# Patient Record
Sex: MALE | Race: WHITE | NOT HISPANIC OR LATINO
[De-identification: names, ages, dates, MRNs, and addresses within clinical notes are randomized per-mention and may not be internally consistent; named-entity substitution may affect disease eponyms.]

---
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
## 2018-01-02 ENCOUNTER — APPOINTMENT (OUTPATIENT)
Dept: OTOLARYNGOLOGY | Facility: CLINIC | Age: 18
End: 2018-01-02
Payer: COMMERCIAL

## 2018-01-02 VITALS
SYSTOLIC BLOOD PRESSURE: 116 MMHG | HEART RATE: 62 BPM | HEIGHT: 68 IN | WEIGHT: 162 LBS | BODY MASS INDEX: 24.55 KG/M2 | DIASTOLIC BLOOD PRESSURE: 73 MMHG

## 2018-01-02 DIAGNOSIS — C73 MALIGNANT NEOPLASM OF THYROID GLAND: ICD-10-CM

## 2018-01-02 PROCEDURE — 99024 POSTOP FOLLOW-UP VISIT: CPT

## 2018-01-29 ENCOUNTER — APPOINTMENT (OUTPATIENT)
Dept: NUCLEAR MEDICINE | Facility: HOSPITAL | Age: 18
End: 2018-01-29

## 2018-01-30 ENCOUNTER — APPOINTMENT (OUTPATIENT)
Dept: NUCLEAR MEDICINE | Facility: HOSPITAL | Age: 18
End: 2018-01-30

## 2018-01-31 ENCOUNTER — APPOINTMENT (OUTPATIENT)
Dept: NUCLEAR MEDICINE | Facility: HOSPITAL | Age: 18
End: 2018-01-31

## 2018-02-02 ENCOUNTER — APPOINTMENT (OUTPATIENT)
Dept: NUCLEAR MEDICINE | Facility: HOSPITAL | Age: 18
End: 2018-02-02

## 2018-11-05 ENCOUNTER — INBOUND DOCUMENT (OUTPATIENT)
Age: 18
End: 2018-11-05

## 2019-04-29 NOTE — ASU PREOP CHECKLIST - TYPE OF SOLUTION
Noted.  Please call and  Please convey to patient:    1.  I would not do hormone testing  2.  Is there a rash (related to \"my skin is very reactive\") any fever? Flu like symptoms?   3.  This can be related to her Bipolar disorder but it is hard to say as hormones play a part in our mood throughout our lives.  4.  Any other new things, diet, medications, OTC, herbal?   5.  Any SI?  6.  Diarrhea is not usually associated with Lamictal    Did patient noticed this before when taking Lamictal?  Is it possible patient could be pregnant?  At this point if all questions above are negative if this is due to Lamictal or something else.  Patient may discontinue Lamictal though it is at low dose and would look to have improvement in depression at higher dose.  Find out if it is intolerable and patient would like to stop at this point given the information provided.    Thank you.   LR

## 2019-08-05 ENCOUNTER — INBOUND DOCUMENT (OUTPATIENT)
Age: 19
End: 2019-08-05

## 2020-03-18 NOTE — H&P PST ADULT - ENDOCRINE COMMENTS
ER notified. They will be in contact with patient to arrange COVID-19 testing.   denies diabetes; thyroid cancer; enlarged lymph nodes

## 2021-06-30 PROBLEM — S06.0X9A CONCUSSION WITH LOSS OF CONSCIOUSNESS OF UNSPECIFIED DURATION, INITIAL ENCOUNTER: Chronic | Status: ACTIVE | Noted: 2017-10-25

## 2021-06-30 PROBLEM — C73 MALIGNANT NEOPLASM OF THYROID GLAND: Chronic | Status: ACTIVE | Noted: 2017-10-25

## 2021-06-30 PROBLEM — F41.9 ANXIETY DISORDER, UNSPECIFIED: Chronic | Status: ACTIVE | Noted: 2017-10-25

## 2021-06-30 PROBLEM — G93.5 COMPRESSION OF BRAIN: Chronic | Status: ACTIVE | Noted: 2017-10-25

## 2021-06-30 PROBLEM — R59.9 ENLARGED LYMPH NODES, UNSPECIFIED: Chronic | Status: ACTIVE | Noted: 2017-10-25

## 2021-07-06 ENCOUNTER — APPOINTMENT (OUTPATIENT)
Dept: CARDIOLOGY | Facility: CLINIC | Age: 21
End: 2021-07-06
Payer: COMMERCIAL

## 2021-07-06 ENCOUNTER — NON-APPOINTMENT (OUTPATIENT)
Age: 21
End: 2021-07-06

## 2021-07-06 VITALS — OXYGEN SATURATION: 100 % | HEART RATE: 55 BPM | DIASTOLIC BLOOD PRESSURE: 80 MMHG | SYSTOLIC BLOOD PRESSURE: 125 MMHG

## 2021-07-06 DIAGNOSIS — G89.29 CHEST PAIN, UNSPECIFIED: ICD-10-CM

## 2021-07-06 DIAGNOSIS — Z82.49 FAMILY HISTORY OF ISCHEMIC HEART DISEASE AND OTHER DISEASES OF THE CIRCULATORY SYSTEM: ICD-10-CM

## 2021-07-06 DIAGNOSIS — R07.9 CHEST PAIN, UNSPECIFIED: ICD-10-CM

## 2021-07-06 PROCEDURE — 99204 OFFICE O/P NEW MOD 45 MIN: CPT

## 2021-07-06 PROCEDURE — 93000 ELECTROCARDIOGRAM COMPLETE: CPT

## 2021-07-06 RX ORDER — LEVOTHYROXINE SODIUM 0.12 MG/1
125 TABLET ORAL
Qty: 90 | Refills: 1 | Status: ACTIVE | COMMUNITY
Start: 2017-11-14

## 2021-07-06 RX ORDER — PAROXETINE HYDROCHLORIDE 10 MG/1
10 TABLET, FILM COATED ORAL
Refills: 0 | Status: DISCONTINUED | COMMUNITY
End: 2021-07-06

## 2021-07-13 ENCOUNTER — APPOINTMENT (OUTPATIENT)
Dept: CARDIOLOGY | Facility: CLINIC | Age: 21
End: 2021-07-13

## 2021-07-27 PROBLEM — R07.9 CHRONIC CHEST PAIN: Status: ACTIVE | Noted: 2021-07-06

## 2021-07-27 NOTE — REASON FOR VISIT
[Symptom and Test Evaluation] : symptom and test evaluation [Parent] : parent [FreeTextEntry1] : 21 year old male presents for assessment of chronic chest pain

## 2021-07-27 NOTE — DISCUSSION/SUMMARY
[Patient] : the patient [Parent] : the parent [FreeTextEntry1] : Mr. White is a 21 year-old man with known chronic chest pain\par \par Plan:\par 1. Unlikely cardiac symptoms, however we will obtain transthoracic echocardiogram \par 2. c/w optimal management

## 2021-07-27 NOTE — HISTORY OF PRESENT ILLNESS
[FreeTextEntry1] : Mr. White is a 21 year-old man with known chronic chest pain who  intermittent atypical chest pain over the past 1.5 years. Episodes were described as sharp, non-pleuritic, positional, and always improves with physical exertion. He noticed increased pain frequency with recent life stressors. He does not report dyspnea, palpitations or dizziness.

## 2021-08-15 NOTE — PATIENT PROFILE ADULT. - SOURCE OF INFORMATION, PROFILE
patient/home [phone]; authorized cell [phone], Siri (mother) and father, Jacinto [phone] to receive information Back Pain

## 2025-03-20 NOTE — ASU PREOP CHECKLIST - TO WHOM
Diffuse atherosclerotic disease as per arterial Dopplers 2022.  She was seen by vascular and was recommended statin which patient had refused.  Recommended to have repeat ultrasound in a couple of years.  Will repeat the blood work before ordering further testing.  Orders:    Lipoprotein A (LPA); Future     JACOB Jang